# Patient Record
Sex: FEMALE | Race: WHITE | URBAN - NONMETROPOLITAN AREA
[De-identification: names, ages, dates, MRNs, and addresses within clinical notes are randomized per-mention and may not be internally consistent; named-entity substitution may affect disease eponyms.]

---

## 2024-01-19 ENCOUNTER — OFFICE VISIT (OUTPATIENT)
Age: 29
End: 2024-01-19
Payer: COMMERCIAL

## 2024-01-19 VITALS
DIASTOLIC BLOOD PRESSURE: 80 MMHG | HEART RATE: 122 BPM | OXYGEN SATURATION: 97 % | WEIGHT: 204 LBS | RESPIRATION RATE: 20 BRPM | TEMPERATURE: 98.2 F | BODY MASS INDEX: 32.02 KG/M2 | HEIGHT: 67 IN | SYSTOLIC BLOOD PRESSURE: 116 MMHG

## 2024-01-19 DIAGNOSIS — J02.9 SORE THROAT: ICD-10-CM

## 2024-01-19 DIAGNOSIS — R52 BODY ACHES: ICD-10-CM

## 2024-01-19 DIAGNOSIS — J06.9 VIRAL URI: Primary | ICD-10-CM

## 2024-01-19 LAB
INFLUENZA A ANTIBODY: NORMAL
INFLUENZA B ANTIBODY: NORMAL
S PYO AG THROAT QL: NORMAL
SARS-COV-2 N GENE RESP QL NAA+PROBE: NOT DETECTED

## 2024-01-19 PROCEDURE — 99203 OFFICE O/P NEW LOW 30 MIN: CPT

## 2024-01-19 ASSESSMENT — ENCOUNTER SYMPTOMS
EYE DISCHARGE: 0
SORE THROAT: 0
WHEEZING: 0
EYE ITCHING: 0
VOMITING: 0
EYE REDNESS: 0
NAUSEA: 0
ALLERGIC/IMMUNOLOGIC NEGATIVE: 1
BACK PAIN: 0
CONSTIPATION: 0
ABDOMINAL PAIN: 0
DIARRHEA: 0
SINUS PAIN: 0
COUGH: 1
CHEST TIGHTNESS: 0
SINUS PRESSURE: 1
RHINORRHEA: 0
SHORTNESS OF BREATH: 0

## 2024-01-19 NOTE — PROGRESS NOTES
Recommended OTC motrin/tylenol for fever and/or body aches, unless contraindicated.     Any condition can change, despite proper treatment. Therefore, if symptoms still persist or worsen after treatment plan intitated today, patient is to go to the nearest ER, call PCP, or return to urgent care for further evaluation. Urgent Care evaluation today is not a substitute for PCP visit. Follow up care is the patient's responsibility to discuss and review this UC visit.      Electronically signed by [email or web address] Cadet NP on 1/19/2024 at 9:08 AM

## 2024-01-19 NOTE — PATIENT INSTRUCTIONS
Flu and strep test negative   Covid test pending; will call you with results.     No antibiotic at this time due to likely viral etiology. Will provide supportive care. Discussed this with patient who verbalized understanding of treatment plan.    - Increase fluid intake  - Encouraged adequate rest  - Recommended OTC antihistamine, such as Claritin or zyrtec  - Recommended OTC Flonase  - Recommended OTC Robitussin or Delsym for cough   - Recommended OTC motrin/tylenol for fever and/or body aches, unless contraindicated.     Any condition can change, despite proper treatment. Therefore, if symptoms still persist or worsen after treatment plan intitated today, patient is to go to the nearest ER, call PCP, or return to urgent care for further evaluation. Urgent Care evaluation today is not a substitute for PCP visit. Follow up care is the patient's responsibility to discuss and review this UC visit.

## 2025-02-24 PROCEDURE — 87636 SARSCOV2 & INF A&B AMP PRB: CPT
